# Patient Record
Sex: FEMALE | Race: WHITE | URBAN - METROPOLITAN AREA
[De-identification: names, ages, dates, MRNs, and addresses within clinical notes are randomized per-mention and may not be internally consistent; named-entity substitution may affect disease eponyms.]

---

## 2021-01-12 ENCOUNTER — OFFICE VISIT (OUTPATIENT)
Dept: URBAN - METROPOLITAN AREA CLINIC 41 | Facility: CLINIC | Age: 86
End: 2021-01-12
Payer: MEDICARE

## 2021-01-12 PROCEDURE — 92134 CPTRZ OPH DX IMG PST SGM RTA: CPT | Performed by: OPHTHALMOLOGY

## 2021-01-12 PROCEDURE — 99203 OFFICE O/P NEW LOW 30 MIN: CPT | Performed by: OPHTHALMOLOGY

## 2021-01-12 PROCEDURE — 67028 INJECTION EYE DRUG: CPT | Performed by: OPHTHALMOLOGY

## 2021-01-12 ASSESSMENT — INTRAOCULAR PRESSURE
OS: 12
OD: 14

## 2021-01-12 NOTE — IMPRESSION/PLAN
Impression: Nonexudative age-related macular degeneration, right eye, advanced atrophic with subfoveal involvement: H35.3114. Plan: Exam/OCT show GA/drusen without IRF/SRF/heme. Cont AREDS/AG; RTC immediately prn dec VA, inc Sxs.

## 2021-01-12 NOTE — IMPRESSION/PLAN
Impression: Exudative age-related macular degeneration, left eye, with active choroidal neovascularization: H35.9377. Plan: Exam/OCT show CNVM w/o recurrent IRF/SRF/heme s/p Eylea 11/11/20 (Nicolas). Pt has a h/o recurrence, recommend Eylea OS today to prevent VA loss. Will schedule with [facility] in 8 weeks, as pt is not sure when she will travel back to [place]. Thanks, Francisca. 

8 weeks, DFE/OCT OU, Eylea OS at [facility] or with Dr. Ami Lau

## 2021-03-09 ENCOUNTER — OFFICE VISIT (OUTPATIENT)
Dept: URBAN - METROPOLITAN AREA CLINIC 41 | Facility: CLINIC | Age: 86
End: 2021-03-09
Payer: MEDICARE

## 2021-03-09 DIAGNOSIS — H35.3221 EXUDATIVE AGE-RELATED MACULAR DEGENERATION, LEFT EYE, WITH ACTIVE CHOROIDAL NEOVASCULARIZATION: Primary | ICD-10-CM

## 2021-03-09 DIAGNOSIS — H35.3114 NONEXUDATIVE AGE-RELATED MACULAR DEGENERATION, RIGHT EYE, ADVANCED ATROPHIC WITH SUBFOVEAL INVOLVEMENT: ICD-10-CM

## 2021-03-09 PROCEDURE — 67028 INJECTION EYE DRUG: CPT | Performed by: OPHTHALMOLOGY

## 2021-03-09 PROCEDURE — 92134 CPTRZ OPH DX IMG PST SGM RTA: CPT | Performed by: OPHTHALMOLOGY

## 2021-03-09 PROCEDURE — 92012 INTRM OPH EXAM EST PATIENT: CPT | Performed by: OPHTHALMOLOGY

## 2021-03-09 ASSESSMENT — INTRAOCULAR PRESSURE
OS: 11
OD: 13

## 2021-03-09 NOTE — IMPRESSION/PLAN
Impression: Exudative age-related macular degeneration, left eye, with active choroidal neovascularization: H35.3221. Plan: Exam/OCT show CNVM w/o recurrent IRF/SRF/heme s/p Eylea last visit. Pt has a h/o recurrence, recommend Eylea OS today to prevent VA loss. Pt asked to f/u with Dr. Jaci Miguel in 8 weeks.  Thanks, Melanie Rios.

photos/FA/ICG/OCT OU, Eylea OS on return visit

## 2021-08-16 ENCOUNTER — OFFICE VISIT (OUTPATIENT)
Dept: URBAN - METROPOLITAN AREA CLINIC 27 | Facility: CLINIC | Age: 86
End: 2021-08-16
Payer: MEDICARE

## 2021-08-16 DIAGNOSIS — Z96.1 PRESENCE OF INTRAOCULAR LENS: ICD-10-CM

## 2021-08-16 PROCEDURE — 99213 OFFICE O/P EST LOW 20 MIN: CPT | Performed by: OPHTHALMOLOGY

## 2021-08-16 PROCEDURE — 92134 CPTRZ OPH DX IMG PST SGM RTA: CPT | Performed by: OPHTHALMOLOGY

## 2021-08-16 PROCEDURE — 92250 FUNDUS PHOTOGRAPHY W/I&R: CPT | Performed by: OPHTHALMOLOGY

## 2021-08-16 PROCEDURE — 92242 FLUORESCEIN&ICG ANGIOGRAPHY: CPT | Performed by: OPHTHALMOLOGY

## 2021-08-16 PROCEDURE — 67028 INJECTION EYE DRUG: CPT | Performed by: OPHTHALMOLOGY

## 2021-08-16 ASSESSMENT — INTRAOCULAR PRESSURE
OD: 13
OS: 14

## 2021-08-16 NOTE — IMPRESSION/PLAN
Impression: dry AMD/GA OD Plan: Exam/photos/OCT show GA/drusen without IRF/SRF/heme. FA 8/16/21 shows window defect and staining. ICG shows window defect. Cont AREDS/AG; RTC immediately prn dec VA, inc Sxs.

## 2021-08-16 NOTE — IMPRESSION/PLAN
Impression: wet AMD OS Plan: Exam/photos/OCT show CNVM w/o recurrent IRF/SRF/heme s/p Eylea last visit. FA 8/16/21 shows staining with slow leakage. ICG shows staining. Pt has a h/o recurrence, recommend q8 week maint Eylea OS today to prevent VA loss.  

OCT OU, Eylea OS on return visit

## 2021-12-14 ENCOUNTER — OFFICE VISIT (OUTPATIENT)
Dept: URBAN - METROPOLITAN AREA CLINIC 41 | Facility: CLINIC | Age: 86
End: 2021-12-14
Payer: MEDICARE

## 2021-12-14 PROCEDURE — 92134 CPTRZ OPH DX IMG PST SGM RTA: CPT | Performed by: OPHTHALMOLOGY

## 2021-12-14 PROCEDURE — 67028 INJECTION EYE DRUG: CPT | Performed by: OPHTHALMOLOGY

## 2021-12-14 PROCEDURE — 99213 OFFICE O/P EST LOW 20 MIN: CPT | Performed by: OPHTHALMOLOGY

## 2021-12-14 ASSESSMENT — INTRAOCULAR PRESSURE
OD: 17
OS: 14

## 2021-12-14 NOTE — IMPRESSION/PLAN
Impression: wet AMD OS Plan: Exam/OCT show CNVM w/o recurrent IRF/SRF/heme s/p Eylea last visit. FA 8/16/21 showed staining with slow leakage. ICG showed staining. Pt has a h/o recurrence, recommend q8 week maint Eylea OS today to prevent VA loss. Pt will f/u in [place] in 8 weeks. 

photos/FA/ICG/OCT OU, Rosa Soto on return visit

## 2021-12-14 NOTE — IMPRESSION/PLAN
Impression: dry AMD/GA OD Plan: Exam/OCT show GA/drusen without IRF/SRF/heme. FA 8/16/21 showed window defect and staining. ICG showed window defect. Cont AREDS/AG; RTC immediately prn dec VA, inc Sxs.

## 2023-01-09 ENCOUNTER — OFFICE VISIT (OUTPATIENT)
Dept: URBAN - METROPOLITAN AREA CLINIC 27 | Facility: CLINIC | Age: 88
End: 2023-01-09
Payer: MEDICARE

## 2023-01-09 DIAGNOSIS — H35.3221 EXUDATIVE AGE-RELATED MACULAR DEGENERATION, LEFT EYE, WITH ACTIVE CHOROIDAL NEOVASCULARIZATION: Primary | ICD-10-CM

## 2023-01-09 DIAGNOSIS — Z96.1 PRESENCE OF INTRAOCULAR LENS: ICD-10-CM

## 2023-01-09 DIAGNOSIS — H35.3114 NONEXUDATIVE AGE-RELATED MACULAR DEGENERATION, RIGHT EYE, ADVANCED ATROPHIC WITH SUBFOVEAL INVOLVEMENT: ICD-10-CM

## 2023-01-09 PROCEDURE — 92014 COMPRE OPH EXAM EST PT 1/>: CPT | Performed by: OPHTHALMOLOGY

## 2023-01-09 PROCEDURE — 92242 FLUORESCEIN&ICG ANGIOGRAPHY: CPT | Performed by: OPHTHALMOLOGY

## 2023-01-09 PROCEDURE — 92250 FUNDUS PHOTOGRAPHY W/I&R: CPT | Performed by: OPHTHALMOLOGY

## 2023-01-09 PROCEDURE — 92134 CPTRZ OPH DX IMG PST SGM RTA: CPT | Performed by: OPHTHALMOLOGY

## 2023-01-09 PROCEDURE — 67028 INJECTION EYE DRUG: CPT | Performed by: OPHTHALMOLOGY

## 2023-01-09 ASSESSMENT — INTRAOCULAR PRESSURE
OS: 14
OD: 14

## 2023-01-09 NOTE — IMPRESSION/PLAN
Impression: dry AMD/GA OD Plan: Exam/OCT show GA/drusen without IRF/SRF/heme OD. Photos 1/9/23 showed GA. FA 1/9/23 showed window defect and staining. ICG showed window defect. Cont AREDS/AG; RTC immediately prn dec VA, inc Sxs.

## 2023-01-09 NOTE — IMPRESSION/PLAN
Impression: wet AMD OS Plan: Exam/OCT show CNVM w/o recurrent IRF/SRF/heme OS. Photos 1/9/23 showed CNVM. FA 1/9/23 showed staining. ICG showed staining and window defect. Given h/o recurrence, recommend q8 week maint Eylea OS today to prevent VA loss. Pt will f/u in [place] in 8 weeks.  Thanks, Anshu Pacheco.

photos/FA/ICG/OCT OU, Jose Pate on return visit

## 2024-12-27 ENCOUNTER — OFFICE VISIT (OUTPATIENT)
Dept: URBAN - METROPOLITAN AREA CLINIC 27 | Facility: CLINIC | Age: 89
End: 2024-12-27
Payer: MEDICARE

## 2024-12-27 DIAGNOSIS — Z96.1 PRESENCE OF INTRAOCULAR LENS: ICD-10-CM

## 2024-12-27 DIAGNOSIS — H35.3114 NONEXUDATIVE AGE-RELATED MACULAR DEGENERATION, RIGHT EYE, ADVANCED ATROPHIC WITH SUBFOVEAL INVOLVEMENT: ICD-10-CM

## 2024-12-27 DIAGNOSIS — H35.3221 EXUDATIVE AGE-RELATED MACULAR DEGENERATION, LEFT EYE, WITH ACTIVE CHOROIDAL NEOVASCULARIZATION: Primary | ICD-10-CM

## 2024-12-27 PROCEDURE — 92235 FLUORESCEIN ANGRPH MLTIFRAME: CPT | Performed by: OPHTHALMOLOGY

## 2024-12-27 PROCEDURE — 92134 CPTRZ OPH DX IMG PST SGM RTA: CPT | Performed by: OPHTHALMOLOGY

## 2024-12-27 PROCEDURE — 92014 COMPRE OPH EXAM EST PT 1/>: CPT | Performed by: OPHTHALMOLOGY

## 2024-12-27 PROCEDURE — 67028 INJECTION EYE DRUG: CPT | Performed by: OPHTHALMOLOGY

## 2024-12-27 ASSESSMENT — INTRAOCULAR PRESSURE
OS: 13
OD: 15